# Patient Record
Sex: MALE | Race: BLACK OR AFRICAN AMERICAN
[De-identification: names, ages, dates, MRNs, and addresses within clinical notes are randomized per-mention and may not be internally consistent; named-entity substitution may affect disease eponyms.]

---

## 2017-03-01 ENCOUNTER — HOSPITAL ENCOUNTER (EMERGENCY)
Dept: HOSPITAL 10 - FTE | Age: 67
Discharge: HOME | End: 2017-03-01
Payer: COMMERCIAL

## 2017-03-01 VITALS
WEIGHT: 185.19 LBS | BODY MASS INDEX: 26.51 KG/M2 | WEIGHT: 185.19 LBS | HEIGHT: 70 IN | BODY MASS INDEX: 26.51 KG/M2 | HEIGHT: 70 IN

## 2017-03-01 DIAGNOSIS — F17.210: ICD-10-CM

## 2017-03-01 DIAGNOSIS — I10: ICD-10-CM

## 2017-03-01 DIAGNOSIS — S49.91XA: Primary | ICD-10-CM
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
## 2017-03-01 PROCEDURE — 99283 EMERGENCY DEPT VISIT LOW MDM: CPT
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
## 2018-10-23 ENCOUNTER — OFFICE (OUTPATIENT)
Dept: URBAN - METROPOLITAN AREA CLINIC 45 | Facility: CLINIC | Age: 68
End: 2018-10-23

## 2018-10-23 VITALS
SYSTOLIC BLOOD PRESSURE: 137 MMHG | HEIGHT: 71 IN | HEART RATE: 51 BPM | DIASTOLIC BLOOD PRESSURE: 87 MMHG | WEIGHT: 180 LBS

## 2018-10-23 DIAGNOSIS — Z12.11 SCREENING FOR COLON CANCER: ICD-10-CM

## 2018-10-23 DIAGNOSIS — B18.2 HEPATITIS C CHRONIC: ICD-10-CM

## 2018-10-23 PROCEDURE — 99203 OFFICE O/P NEW LOW 30 MIN: CPT | Performed by: INTERNAL MEDICINE

## 2018-10-23 NOTE — SERVICEHPINOTES
The patient has not seen me in over 2 years.  I had treated him for hepatitis C and 2016 and he had been lost to follow up as his insurance changed.  I did find laboratories from earlier of this year that showed an ALT 23.  He has complaints of mild right lower quadrant discomfort.  He is on chronic oxycodone for neck and back related issues.  He has never before had a colonoscopy.  No significant rectal bleeding, weight loss.  He has mild constipation with oxycodone.

## 2019-03-24 ENCOUNTER — HOSPITAL ENCOUNTER (EMERGENCY)
Dept: HOSPITAL 91 - E/R | Age: 69
Discharge: HOME | End: 2019-03-24
Payer: COMMERCIAL

## 2019-03-24 ENCOUNTER — HOSPITAL ENCOUNTER (EMERGENCY)
Dept: HOSPITAL 10 - E/R | Age: 69
Discharge: HOME | End: 2019-03-24
Payer: COMMERCIAL

## 2019-03-24 VITALS — WEIGHT: 176.37 LBS | HEIGHT: 60 IN | BODY MASS INDEX: 34.63 KG/M2

## 2019-03-24 VITALS — DIASTOLIC BLOOD PRESSURE: 75 MMHG | RESPIRATION RATE: 18 BRPM | SYSTOLIC BLOOD PRESSURE: 148 MMHG | HEART RATE: 58 BPM

## 2019-03-24 DIAGNOSIS — I10: ICD-10-CM

## 2019-03-24 DIAGNOSIS — R07.9: Primary | ICD-10-CM

## 2019-03-24 DIAGNOSIS — R00.1: ICD-10-CM

## 2019-03-24 DIAGNOSIS — Z87.891: ICD-10-CM

## 2019-03-24 LAB
ADD MAN DIFF?: NO
ALANINE AMINOTRANSFERASE: 18 IU/L (ref 13–69)
ALBUMIN/GLOBULIN RATIO: 1.37
ALBUMIN: 4.4 G/DL (ref 3.3–4.9)
ALKALINE PHOSPHATASE: 44 IU/L (ref 42–121)
ANION GAP: 14 (ref 5–13)
ASPARTATE AMINO TRANSFERASE: 35 IU/L (ref 15–46)
BASOPHIL #: 0 10^3/UL (ref 0–0.1)
BASOPHILS %: 0.4 % (ref 0–2)
BILIRUBIN,DIRECT: 0 MG/DL (ref 0–0.2)
BILIRUBIN,TOTAL: 0.2 MG/DL (ref 0.2–1.3)
BLOOD UREA NITROGEN: 20 MG/DL (ref 7–20)
CALCIUM: 9.8 MG/DL (ref 8.4–10.2)
CARBON DIOXIDE: 23 MMOL/L (ref 21–31)
CHLORIDE: 105 MMOL/L (ref 97–110)
CREATININE: 1.02 MG/DL (ref 0.61–1.24)
EOSINOPHILS #: 0.1 10^3/UL (ref 0–0.5)
EOSINOPHILS %: 2.1 % (ref 0–7)
GLOBULIN: 3.2 G/DL (ref 1.3–3.2)
GLUCOSE: 114 MG/DL (ref 70–220)
HEMATOCRIT: 45.2 % (ref 42–52)
HEMOGLOBIN: 14.9 G/DL (ref 14–18)
IMMATURE GRANS #M: 0.03 10^3/UL (ref 0–0.03)
IMMATURE GRANS % (M): 0.6 % (ref 0–0.43)
LYMPHOCYTES #: 1.6 10^3/UL (ref 0.8–2.9)
LYMPHOCYTES %: 34.2 % (ref 15–51)
MEAN CORPUSCULAR HEMOGLOBIN: 28.5 PG (ref 29–33)
MEAN CORPUSCULAR HGB CONC: 33 G/DL (ref 32–37)
MEAN CORPUSCULAR VOLUME: 86.6 FL (ref 82–101)
MEAN PLATELET VOLUME: 11.4 FL (ref 7.4–10.4)
MONOCYTE #: 0.4 10^3/UL (ref 0.3–0.9)
MONOCYTES %: 7.6 % (ref 0–11)
NEUTROPHIL #: 2.6 10^3/UL (ref 1.6–7.5)
NEUTROPHILS %: 55.1 % (ref 39–77)
NUCLEATED RED BLOOD CELLS #: 0 10^3/UL (ref 0–0)
NUCLEATED RED BLOOD CELLS%: 0 /100WBC (ref 0–0)
PLATELET COUNT: 203 10^3/UL (ref 140–415)
POTASSIUM: 4.6 MMOL/L (ref 3.5–5.1)
RED BLOOD COUNT: 5.22 10^6/UL (ref 4.7–6.1)
RED CELL DISTRIBUTION WIDTH: 13.3 % (ref 11.5–14.5)
SODIUM: 142 MMOL/L (ref 135–144)
TOTAL PROTEIN: 7.6 G/DL (ref 6.1–8.1)
TROPONIN-I: 0.03 NG/ML (ref 0–0.12)
TROPONIN-I: < 0.012 NG/ML (ref 0–0.12)
WHITE BLOOD COUNT: 4.8 10^3/UL (ref 4.8–10.8)

## 2019-03-24 PROCEDURE — 93005 ELECTROCARDIOGRAM TRACING: CPT

## 2019-03-24 PROCEDURE — 36415 COLL VENOUS BLD VENIPUNCTURE: CPT

## 2019-03-24 PROCEDURE — 84484 ASSAY OF TROPONIN QUANT: CPT

## 2019-03-24 PROCEDURE — 80053 COMPREHEN METABOLIC PANEL: CPT

## 2019-03-24 PROCEDURE — 85025 COMPLETE CBC W/AUTO DIFF WBC: CPT

## 2019-03-24 PROCEDURE — 71045 X-RAY EXAM CHEST 1 VIEW: CPT

## 2019-03-24 PROCEDURE — 99285 EMERGENCY DEPT VISIT HI MDM: CPT

## 2019-03-24 RX ADMIN — ASPIRIN 325 MG ORAL TABLET 1 MG: 325 PILL ORAL at 08:30

## 2019-03-24 NOTE — ERD
ER Documentation


Chief Complaint


Chief Complaint





chest pain since 6 days. sent here by pmd.  no N/V.no sob noted.





HPI


69-year-old male history of hypertension presents the ED, referred from clinic 2


days ago for evaluation of chest pain.  Patient reports a 6-day history of 


unprovoked, intermittent, mild, stabbing, nonradiating left parasternal chest 


pain lasting seconds several times per day but none yesterday.  Symptoms are not


accompanied by shortness of breath, nausea, vomiting or diaphoresis.  No 


relieving or exacerbating factors.  Denies leg pain or swelling.  No cough or 


hemoptysis.  No orthopnea or exertional dyspnea.  Chronic low back pain but 


denies abdominal pain, diarrhea or constipation.  Patient was seen in the clinic


2 days ago, EKG revealed T wave inversions in leads V1 and V2 and he was 


referred to the ED for further evaluation but does not present until today.





ROS


All systems reviewed and are negative except as per history of present illness.





Medications


Home Meds


Reported Medications


Escitalopram Oxalate* (Lexapro*) 10 Mg Tablet, 10 MG PO DAILY, #30 TAB


   3/24/19


Baclofen* (Baclofen*) 10 Mg Tablet, 10 MG PO QHS, TAB


   3/24/19


Gabapentin* (Gabapentin*) 600 Mg Tablet, 600 MG PO TID, #90 TAB


   3/24/19


Oxycodone Hcl* (IR) (Oxycodone Hcl*) 30 Mg Tablet, 30 MG PO TID PRN for PAIN, TA


B


   3/24/19


Hydrochlorothiazide* (Hydrochlorothiazide*) 25 Mg Tab, 25 MG PO DAILY, #30 TAB


   3/24/19


Discontinued Scripts


Naproxen* (Naprosyn*) 500 Mg Tablet, 500 MG PO BID PRN for PAIN AND/OR 


INFLAMMATION, #20 TAB


   Prov:CAN RAMEY PA-C         3/1/17


Tramadol HCl (Tramadol HCl) 50 Mg Tablet, 50 MG PO Q4 PRN for PAIN, #20 TAB


   Prov:CAN RAMEY PA-C         3/1/17


Diphenhydramine Hcl* (Benadryl*) 50 Mg Cap, 50 MG PO QHS PRN for INSOMNIA, #15 


CAP


   Prov:IGNACIO PINK NP         16


Ibuprofen* (Motrin*) 800 Mg Tab, 800 MG PO Q6, #60 TAB


   Prov:COLE EVANS FOX GATICA         7/8/15


Cyclobenzaprine Hcl* (Cyclobenzaprine Hcl*) 10 Mg Tablet, 10 MG PO TID, #15 TAB


   Prov:SIMON BARTON LIV         7/5/15


Hydrocodone Bit-Acetaminophen* (Norco*) 5-325 Mg Tab, 1 TAB PO Q6 PRN for PAIN, 


#20 TAB


   Prov:SIMON BARTON LIV         7/5/15





Allergies


Allergies:  


Coded Allergies:  


     No Known Allergy (Unverified , 3/24/19)





PMhx/Soc


Reviewed in chart. As per HPI.


History of Surgery:  Yes (Hernia repair)


Anesthesia Reaction:  No


Hx Neurological Disorder:  No


Hx Respiratory Disorders:  No


Hx Cardiac Disorders:  No


Hx Psychiatric Problems:  No


Hx Miscellaneous Medical Probl:  Yes ( HTN, hep C, chronic back pain)


Hx Alcohol Use:  Yes


Hx Substance Use:  No


Hx Tobacco Use:  Yes





FmHx


No coronary artery disease or cancer.





Physical Exam


Vitals





Vital Signs


  Date      Temp  Pulse  Resp  B/P (MAP)   Pulse Ox  O2          O2 Flow    FiO2


Time                                                 Delivery    Rate


   3/24/19  98.1     58    18      148/75       100  Room Air


     12:22                           (99)


   3/24/19           39    16      151/86       100  Room Air


     11:01                          (107)


   3/24/19           40    16      128/85       100  Room Air


     08:30                           (99)


   3/24/19  98.2     53    16      130/84        98


     06:56                           (99)





Physical Exam


Const:   No acute distress


Head:   Atraumatic 


Eyes:    Normal Conjunctiva


ENT:    Normal External Ears, Nose and Mouth.  No lymphadenopathy or masses.


Neck:               Full range of motion. No JVD.


Resp:   Clear to auscultation bilaterally


Cardio:   Bradycardia.  Regular rate and rhythm, no murmurs


Chest Wall:   No tenderness


Abd:    Soft, non tender, non distended.  No masses.  No rebound or guarding.  


Normal bowel sounds


Skin:   No petechiae or rashes


Back:   No midline or flank tenderness


Ext:    No cyanosis, or edema.  Pulses 4+ in all extremities


Neur:   Awake and alert.  No focal deficit.


Psych:    Normal Mood and Affect


Result Diagram:  


3/24/19 0738                                                                    


           3/24/19 0738





Results 24 hrs





Laboratory Tests


Test
                       3/24/19
07:38   3/24/19
10:30          3/25/19
10:21


White Blood Count            4.8 10^3/ul


Red Blood Count             5.22 10^6/ul


Hemoglobin                     14.9 g/dl


Hematocrit                        45.2 %


Mean Corpuscular Volume          86.6 fl


Mean Corpuscular                 28.5 pg


Hemoglobin


Mean Corpuscular              33.0 g/dl 
  
               



Hemoglobin
Concent


Red Cell Distribution             13.3 %


Width


Platelet Count               203 10^3/UL


Mean Platelet Volume             11.4 fl


Immature Granulocytes %          0.600 %


Neutrophils %                     55.1 %


Lymphocytes %                     34.2 %


Monocytes %                        7.6 %


Eosinophils %                      2.1 %


Basophils %                        0.4 %


Nucleated Red Blood Cells    0.0 /100WBC


%


Immature Granulocytes #    0.030 10^3/ul


Neutrophils #                2.6 10^3/ul


Lymphocytes #                1.6 10^3/ul


Monocytes #                  0.4 10^3/ul


Eosinophils #                0.1 10^3/ul


Basophils #                  0.0 10^3/ul


Nucleated Red Blood Cells    0.0 10^3/ul


#


Sodium Level                  142 mmol/L


Potassium Level               4.6 mmol/L


Chloride Level                105 mmol/L


Carbon Dioxide Level           23 mmol/L


Anion Gap                             14


Blood Urea Nitrogen             20 mg/dl


Creatinine                    1.02 mg/dl


Est Glomerular Filtrat     > 60 mL/min 
   
               



Rate
mL/min


Glucose Level                  114 mg/dl


Calcium Level                  9.8 mg/dl


Total Bilirubin                0.2 mg/dl


Direct Bilirubin              0.00 mg/dl


Indirect Bilirubin             0.2 mg/dl


Aspartate Amino                 35 IU/L 
  
               



Transf
(AST/SGOT)


Alanine                         18 IU/L 
  
               



Aminotransferase
(ALT/SGP


T)


Alkaline Phosphatase             44 IU/L


Troponin I                   0.029 ng/ml   < 0.012 ng/ml


Total Protein                   7.6 g/dl


Albumin                         4.4 g/dl


Globulin                       3.20 g/dl


Albumin/Globulin Ratio              1.37


Lab Scanned Report
        
               
               REFERENCE LAB
[id number]





Current Medications


 Medications
   Dose
          Sig/Ban
       Start Time
   Status  Last


 (Trade)       Ordered        Route
 PRN     Stop Time              Admin
Dose


                              Reason                                Admin


 Aspirin
       325 mg         ONCE  STAT
    3/24/19       DC           3/24/19


(Aspirin)                     PO
            07:10
                       08:30



                                             3/24/19 07:12








Procedures/MDM


DOCUMENTS REVIEWED:   ED nurse, prior records





EKG:  Time: 659.  Sinus bradycardia.  Ventricular rate 47.  Normal [place] and QRS. 


Nonspecific T wave changes.  No acute ST elevation or depression.  No ectopy.   


My Interpretation








IMAGING: Chest AP portable.  Cardiac silhouette is normal.  The costophrenic 


angles are clear.  No effusions or infiltrates.  No abnormalities of the bony 


thorax.  My interpretation.





Observation Note:


   Time:      3 hours


   Family Hx:   No Hypertension, coronary artery disease or sudden cardiac death


   Evaluation:   Multiple exams showed improving symptoms and no evidence of 


acute coronary syndrome








MEDICAL DECISION MAKIN-year-old male history of hypertension presents the 


ED, referred from clinic 2 days ago for evaluation of chest pain.  CBC is 


negative for anemia, leukocytosis or thrombocytopenia.  Chemistry reveals no 


evidence of renal insufficiency or electrolyte abnormalities.  Serial troponins 


are negative.  EKG significant for bradycardia but no ischemic EKG changes or 


heart block.  The patient presents with chest pain and I considered pulmonary 


embolism, aortic dissection, pneumothorax among other diagnoses.  Evaluation for


acute coronary syndrome was performed.  The HEART score was utilized for risk 


stratification and found to be = 3.  Repeat EKG and troponin @ 3 hours were 


unchanged.  Based on this evaluation the patients risk of major adverse cardiac 


events is <1%.  Asymptomatic sinus bradycardia with long history of same.  


Shared decision making occurred with patient and the decision has been made to 


discharge the patient for outpatient evaluation and functional study within 72 


hours.  Stable for discharge with precautionary instructions and outpatient 


follow-up as counseled.








Counseled patient[ and family] regarding diagnostic workup, diagnosis and need 


for followup. Understands to return to ED if symptoms recur, worsen or any other


concerns.





Departure


Diagnosis:  


   Primary Impression:  


   Chest pain with low risk for cardiac etiology


   Additional Impression:  


   Bradycardia


Condition:  Stable











BETHANY ESTRADA MD           Mar 24, 2019 07:07

## 2019-04-29 ENCOUNTER — AMBULATORY SURGICAL CENTER (OUTPATIENT)
Dept: URBAN - METROPOLITAN AREA SURGERY 37 | Facility: SURGERY | Age: 69
End: 2019-04-29

## 2019-04-29 VITALS
TEMPERATURE: 98 F | HEART RATE: 53 BPM | HEIGHT: 71 IN | WEIGHT: 183 LBS | DIASTOLIC BLOOD PRESSURE: 85 MMHG | SYSTOLIC BLOOD PRESSURE: 150 MMHG

## 2019-04-29 DIAGNOSIS — Z12.11 ENCOUNTER FOR SCREENING FOR MALIGNANT NEOPLASM OF COLON: ICD-10-CM

## 2019-04-29 DIAGNOSIS — D12.5 BENIGN NEOPLASM OF SIGMOID COLON: ICD-10-CM

## 2019-04-29 PROCEDURE — 45380 COLONOSCOPY AND BIOPSY: CPT | Performed by: INTERNAL MEDICINE

## 2023-02-03 ENCOUNTER — OFFICE (OUTPATIENT)
Dept: URBAN - METROPOLITAN AREA CLINIC 45 | Facility: CLINIC | Age: 73
End: 2023-02-03

## 2023-02-03 VITALS
HEIGHT: 71 IN | SYSTOLIC BLOOD PRESSURE: 120 MMHG | DIASTOLIC BLOOD PRESSURE: 77 MMHG | HEART RATE: 49 BPM | TEMPERATURE: 98.2 F | WEIGHT: 160 LBS

## 2023-02-03 DIAGNOSIS — Z87.891 FORMER SMOKER: ICD-10-CM

## 2023-02-03 DIAGNOSIS — B18.2 HEPATITIS C CHRONIC: ICD-10-CM

## 2023-02-03 DIAGNOSIS — R63.4 WEIGHT LOSS: ICD-10-CM

## 2023-02-03 DIAGNOSIS — R63.0 DECREASED APPETITE: ICD-10-CM

## 2023-02-03 DIAGNOSIS — R10.9: ICD-10-CM

## 2023-02-03 PROCEDURE — 99204 OFFICE O/P NEW MOD 45 MIN: CPT | Performed by: STUDENT IN AN ORGANIZED HEALTH CARE EDUCATION/TRAINING PROGRAM

## 2023-02-03 NOTE — SERVICEHPINOTES
72-year-old male with history of hepatitis C status posttreatment who presents for evaluation of fatigue and weight loss. He has lost over 20 pounds in the last few years. He reports a decreased appetite and a chronic right middle quadrant abdominal pain. He denies nausea, vomiting, acid reflux or dysphagia. He has been taking laxatives as he has been more constipated which has been working for him. He has been very concerned about his level of fatigue. He is wondering if his hep C could have recurred since it feels the same as that although we discussed that this is unlikely if he has not resumed any of his previous habits, which he says he has not.

## 2023-02-09 LAB
AMBIG ABBREV CMP14 DEFAULT: (no result)
CBC/DIFF AMBIGUOUS DEFAULT: BASO (ABSOLUTE): 0 X10E3/UL (ref 0–0.2)
CBC/DIFF AMBIGUOUS DEFAULT: BASOS: 1 %
CBC/DIFF AMBIGUOUS DEFAULT: EOS (ABSOLUTE): 0.1 X10E3/UL (ref 0–0.4)
CBC/DIFF AMBIGUOUS DEFAULT: EOS: 3 %
CBC/DIFF AMBIGUOUS DEFAULT: HEMATOCRIT: 45.5 % (ref 37.5–51)
CBC/DIFF AMBIGUOUS DEFAULT: HEMATOLOGY COMMENTS: (no result)
CBC/DIFF AMBIGUOUS DEFAULT: HEMOGLOBIN: 15.3 G/DL (ref 13–17.7)
CBC/DIFF AMBIGUOUS DEFAULT: IMMATURE CELLS: (no result)
CBC/DIFF AMBIGUOUS DEFAULT: IMMATURE GRANS (ABS): 0 X10E3/UL (ref 0–0.1)
CBC/DIFF AMBIGUOUS DEFAULT: IMMATURE GRANULOCYTES: 0 %
CBC/DIFF AMBIGUOUS DEFAULT: LYMPHS (ABSOLUTE): 1.4 X10E3/UL (ref 0.7–3.1)
CBC/DIFF AMBIGUOUS DEFAULT: LYMPHS: 35 %
CBC/DIFF AMBIGUOUS DEFAULT: MCH: 29 PG (ref 26.6–33)
CBC/DIFF AMBIGUOUS DEFAULT: MCHC: 33.6 G/DL (ref 31.5–35.7)
CBC/DIFF AMBIGUOUS DEFAULT: MCV: 86 FL (ref 79–97)
CBC/DIFF AMBIGUOUS DEFAULT: MONOCYTES(ABSOLUTE): 0.2 X10E3/UL (ref 0.1–0.9)
CBC/DIFF AMBIGUOUS DEFAULT: MONOCYTES: 6 %
CBC/DIFF AMBIGUOUS DEFAULT: NEUTROPHILS (ABSOLUTE): 2.2 X10E3/UL (ref 1.4–7)
CBC/DIFF AMBIGUOUS DEFAULT: NEUTROPHILS: 55 %
CBC/DIFF AMBIGUOUS DEFAULT: NRBC: (no result)
CBC/DIFF AMBIGUOUS DEFAULT: PLATELETS: 198 X10E3/UL (ref 150–450)
CBC/DIFF AMBIGUOUS DEFAULT: RBC: 5.28 X10E6/UL (ref 4.14–5.8)
CBC/DIFF AMBIGUOUS DEFAULT: RDW: 13.5 % (ref 11.6–15.4)
CBC/DIFF AMBIGUOUS DEFAULT: WBC: 4 X10E3/UL (ref 3.4–10.8)
COMP. METABOLIC PANEL (14): A/G RATIO: 2 (ref 1.2–2.2)
COMP. METABOLIC PANEL (14): ALBUMIN: 4.9 G/DL — HIGH (ref 3.7–4.7)
COMP. METABOLIC PANEL (14): ALKALINE PHOSPHATASE: 70 IU/L (ref 44–121)
COMP. METABOLIC PANEL (14): ALT (SGPT): 15 IU/L (ref 0–44)
COMP. METABOLIC PANEL (14): AST (SGOT): 24 IU/L (ref 0–40)
COMP. METABOLIC PANEL (14): BILIRUBIN, TOTAL: 0.3 MG/DL (ref 0–1.2)
COMP. METABOLIC PANEL (14): BUN/CREATININE RATIO: 11 (ref 10–24)
COMP. METABOLIC PANEL (14): BUN: 13 MG/DL (ref 8–27)
COMP. METABOLIC PANEL (14): CALCIUM: 9.8 MG/DL (ref 8.6–10.2)
COMP. METABOLIC PANEL (14): CARBON DIOXIDE, TOTAL: 26 MMOL/L (ref 20–29)
COMP. METABOLIC PANEL (14): CHLORIDE: 102 MMOL/L (ref 96–106)
COMP. METABOLIC PANEL (14): CREATININE: 1.14 MG/DL (ref 0.76–1.27)
COMP. METABOLIC PANEL (14): EGFR: 68 ML/MIN/1.73 (ref 59–?)
COMP. METABOLIC PANEL (14): GLOBULIN, TOTAL: 2.5 G/DL (ref 1.5–4.5)
COMP. METABOLIC PANEL (14): GLUCOSE: 100 MG/DL — HIGH (ref 70–99)
COMP. METABOLIC PANEL (14): POTASSIUM: 4.6 MMOL/L (ref 3.5–5.2)
COMP. METABOLIC PANEL (14): PROTEIN, TOTAL: 7.4 G/DL (ref 6–8.5)
COMP. METABOLIC PANEL (14): SODIUM: 140 MMOL/L (ref 134–144)
FERRITIN: 339 NG/ML (ref 30–400)
HCV ANTIBODY RFX TO QUANT PCR: HCV AB: >11 S/CO RATIO — HIGH
HCV RT-PCR, QUANT (NON-GRAPH): HCV LOG10: (no result)
HCV RT-PCR, QUANT (NON-GRAPH): HEPATITIS C QUANTITATION: <15 IU/ML
HCV RT-PCR, QUANT (NON-GRAPH): INTERPRETATION: (no result)
HCV RT-PCR, QUANT (NON-GRAPH): TEST INFORMATION: (no result)
IRON AND TIBC: IRON BIND.CAP.(TIBC): 339 UG/DL (ref 250–450)
IRON AND TIBC: IRON SATURATION: 34 % (ref 15–55)
IRON AND TIBC: IRON: 115 UG/DL (ref 38–169)
IRON AND TIBC: UIBC: 224 UG/DL (ref 111–343)
TSH+FREE T4: T4,FREE(DIRECT): 1.41 NG/DL (ref 0.82–1.77)
TSH+FREE T4: TSH: 1.07 UIU/ML (ref 0.45–4.5)